# Patient Record
Sex: FEMALE | Race: AMERICAN INDIAN OR ALASKA NATIVE | NOT HISPANIC OR LATINO | URBAN - METROPOLITAN AREA
[De-identification: names, ages, dates, MRNs, and addresses within clinical notes are randomized per-mention and may not be internally consistent; named-entity substitution may affect disease eponyms.]

---

## 2024-09-12 ENCOUNTER — EMERGENCY (EMERGENCY)
Facility: HOSPITAL | Age: 24
LOS: 1 days | Discharge: HOME | End: 2024-09-12
Attending: STUDENT IN AN ORGANIZED HEALTH CARE EDUCATION/TRAINING PROGRAM | Admitting: STUDENT IN AN ORGANIZED HEALTH CARE EDUCATION/TRAINING PROGRAM
Payer: MEDICAID

## 2024-09-12 VITALS
WEIGHT: 130.07 LBS | HEIGHT: 63 IN | RESPIRATION RATE: 16 BRPM | TEMPERATURE: 98 F | OXYGEN SATURATION: 100 % | DIASTOLIC BLOOD PRESSURE: 67 MMHG | HEART RATE: 72 BPM | SYSTOLIC BLOOD PRESSURE: 91 MMHG

## 2024-09-12 VITALS
DIASTOLIC BLOOD PRESSURE: 82 MMHG | TEMPERATURE: 98 F | RESPIRATION RATE: 18 BRPM | HEART RATE: 77 BPM | SYSTOLIC BLOOD PRESSURE: 117 MMHG | OXYGEN SATURATION: 100 %

## 2024-09-12 LAB
ALBUMIN SERPL ELPH-MCNC: 3.8 G/DL — SIGNIFICANT CHANGE UP (ref 3.3–5)
ALP SERPL-CCNC: 101 U/L — SIGNIFICANT CHANGE UP (ref 40–120)
ALT FLD-CCNC: 23 U/L — SIGNIFICANT CHANGE UP (ref 12–78)
ANION GAP SERPL CALC-SCNC: 7 MMOL/L — SIGNIFICANT CHANGE UP (ref 5–17)
AST SERPL-CCNC: 32 U/L — SIGNIFICANT CHANGE UP (ref 15–37)
BASOPHILS # BLD AUTO: 0.06 K/UL — SIGNIFICANT CHANGE UP (ref 0–0.2)
BASOPHILS NFR BLD AUTO: 0.6 % — SIGNIFICANT CHANGE UP (ref 0–2)
BILIRUB SERPL-MCNC: 0.2 MG/DL — SIGNIFICANT CHANGE UP (ref 0.2–1.2)
BUN SERPL-MCNC: 12 MG/DL — SIGNIFICANT CHANGE UP (ref 7–23)
CALCIUM SERPL-MCNC: 9.2 MG/DL — SIGNIFICANT CHANGE UP (ref 8.5–10.1)
CHLORIDE SERPL-SCNC: 107 MMOL/L — SIGNIFICANT CHANGE UP (ref 96–108)
CO2 SERPL-SCNC: 24 MMOL/L — SIGNIFICANT CHANGE UP (ref 22–31)
CREAT SERPL-MCNC: 0.71 MG/DL — SIGNIFICANT CHANGE UP (ref 0.5–1.3)
EGFR: 120 ML/MIN/1.73M2 — SIGNIFICANT CHANGE UP
EOSINOPHIL # BLD AUTO: 0.1 K/UL — SIGNIFICANT CHANGE UP (ref 0–0.5)
EOSINOPHIL NFR BLD AUTO: 1.1 % — SIGNIFICANT CHANGE UP (ref 0–6)
GLUCOSE SERPL-MCNC: 87 MG/DL — SIGNIFICANT CHANGE UP (ref 70–99)
HCG SERPL-ACNC: <1 MIU/ML — SIGNIFICANT CHANGE UP
HCG UR QL: NEGATIVE — SIGNIFICANT CHANGE UP
HCT VFR BLD CALC: 39.7 % — SIGNIFICANT CHANGE UP (ref 34.5–45)
HGB BLD-MCNC: 12.7 G/DL — SIGNIFICANT CHANGE UP (ref 11.5–15.5)
IMM GRANULOCYTES NFR BLD AUTO: 0.1 % — SIGNIFICANT CHANGE UP (ref 0–0.9)
LYMPHOCYTES # BLD AUTO: 4.57 K/UL — HIGH (ref 1–3.3)
LYMPHOCYTES # BLD AUTO: 49.4 % — HIGH (ref 13–44)
MCHC RBC-ENTMCNC: 29.5 PG — SIGNIFICANT CHANGE UP (ref 27–34)
MCHC RBC-ENTMCNC: 32 GM/DL — SIGNIFICANT CHANGE UP (ref 32–36)
MCV RBC AUTO: 92.1 FL — SIGNIFICANT CHANGE UP (ref 80–100)
MONOCYTES # BLD AUTO: 0.8 K/UL — SIGNIFICANT CHANGE UP (ref 0–0.9)
MONOCYTES NFR BLD AUTO: 8.6 % — SIGNIFICANT CHANGE UP (ref 2–14)
NEUTROPHILS # BLD AUTO: 3.72 K/UL — SIGNIFICANT CHANGE UP (ref 1.8–7.4)
NEUTROPHILS NFR BLD AUTO: 40.2 % — LOW (ref 43–77)
NRBC # BLD: 0 /100 WBCS — SIGNIFICANT CHANGE UP (ref 0–0)
PLATELET # BLD AUTO: 290 K/UL — SIGNIFICANT CHANGE UP (ref 150–400)
POTASSIUM SERPL-MCNC: 5.1 MMOL/L — SIGNIFICANT CHANGE UP (ref 3.5–5.3)
POTASSIUM SERPL-SCNC: 5.1 MMOL/L — SIGNIFICANT CHANGE UP (ref 3.5–5.3)
PROT SERPL-MCNC: 8 G/DL — SIGNIFICANT CHANGE UP (ref 6–8.3)
RBC # BLD: 4.31 M/UL — SIGNIFICANT CHANGE UP (ref 3.8–5.2)
RBC # FLD: 13.7 % — SIGNIFICANT CHANGE UP (ref 10.3–14.5)
SODIUM SERPL-SCNC: 138 MMOL/L — SIGNIFICANT CHANGE UP (ref 135–145)
WBC # BLD: 9.26 K/UL — SIGNIFICANT CHANGE UP (ref 3.8–10.5)
WBC # FLD AUTO: 9.26 K/UL — SIGNIFICANT CHANGE UP (ref 3.8–10.5)

## 2024-09-12 PROCEDURE — 99284 EMERGENCY DEPT VISIT MOD MDM: CPT | Mod: 25

## 2024-09-12 PROCEDURE — 80053 COMPREHEN METABOLIC PANEL: CPT

## 2024-09-12 PROCEDURE — 70486 CT MAXILLOFACIAL W/O DYE: CPT | Mod: 26,MC

## 2024-09-12 PROCEDURE — 84702 CHORIONIC GONADOTROPIN TEST: CPT

## 2024-09-12 PROCEDURE — 99284 EMERGENCY DEPT VISIT MOD MDM: CPT

## 2024-09-12 PROCEDURE — 70486 CT MAXILLOFACIAL W/O DYE: CPT | Mod: MC

## 2024-09-12 PROCEDURE — 81025 URINE PREGNANCY TEST: CPT

## 2024-09-12 PROCEDURE — 36415 COLL VENOUS BLD VENIPUNCTURE: CPT

## 2024-09-12 PROCEDURE — 85025 COMPLETE CBC W/AUTO DIFF WBC: CPT

## 2024-09-12 RX ORDER — KETOROLAC TROMETHAMINE 30 MG/ML
15 INJECTION, SOLUTION INTRAMUSCULAR ONCE
Refills: 0 | Status: DISCONTINUED | OUTPATIENT
Start: 2024-09-12 | End: 2024-09-12

## 2024-09-12 RX ORDER — SODIUM CHLORIDE 9 MG/ML
1000 INJECTION INTRAMUSCULAR; INTRAVENOUS; SUBCUTANEOUS ONCE
Refills: 0 | Status: COMPLETED | OUTPATIENT
Start: 2024-09-12 | End: 2024-09-12

## 2024-09-12 RX ORDER — METHOCARBAMOL 750 MG/1
750 TABLET, FILM COATED ORAL ONCE
Refills: 0 | Status: COMPLETED | OUTPATIENT
Start: 2024-09-12 | End: 2024-09-12

## 2024-09-12 RX ORDER — IBUPROFEN 600 MG
600 TABLET ORAL ONCE
Refills: 0 | Status: COMPLETED | OUTPATIENT
Start: 2024-09-12 | End: 2024-09-12

## 2024-09-12 RX ADMIN — Medication 600 MILLIGRAM(S): at 20:26

## 2024-09-12 RX ADMIN — METHOCARBAMOL 750 MILLIGRAM(S): 750 TABLET, FILM COATED ORAL at 20:26

## 2024-09-12 NOTE — ED PROVIDER NOTE - NSFOLLOWUPCLINICS_GEN_ALL_ED_FT
[facility]  Dentistry  .  [place] [address]  Phone: [phone]  Fax:

## 2024-09-12 NOTE — ED ADULT NURSE NOTE - NSFALLUNIVINTERV_ED_ALL_ED
Bed/Stretcher in lowest position, wheels locked, appropriate side rails in place/Call bell, personal items and telephone in reach/Instruct patient to call for assistance before getting out of bed/chair/stretcher/Non-slip footwear applied when patient is off stretcher/[place] to call system/Physically safe environment - no spills, clutter or unnecessary equipment/Purposeful proactive rounding/Room/bathroom lighting operational, light cord in reach

## 2024-09-12 NOTE — ED PROVIDER NOTE - OBJECTIVE STATEMENT
24F who presents with right sided jaw pain for past few months. pain with opening the jaw. no fevers or chills, she has an oral surgeon however she was having issues with dental infection which was treated and has not been able to see them for this jaw pain

## 2024-09-12 NOTE — ED ADULT NURSE NOTE - OBJECTIVE STATEMENT
Patient complaining of R jaw pain for 2 months. Patient states it hurts to open mouth. denies taking medication for pain.

## 2024-09-12 NOTE — ED PROVIDER NOTE - PHYSICAL EXAMINATION
Gen: Awake, Alert, NAD  Head:  NC/AT  Eyes:  PERRL, EOMI, Conjunctiva pink, no scleral icterus  ENT:  no exudates, no erythema, uvula midline, TMs clear bilaterally, moist mucus membranes, slightly decreased JAw ROM, some tenderenss to right TMJ area without swelling or overlying skin changes. no signs of infection inside  Neck: supple, nontender, no meningismus, no JVD, trachea midline  Cardiac/CV:  S1 S2, RRR, no murmurs  Respiratory/Pulm:  CTAB, good air movement, normal resp effort, no wheezes/stridor/retractions/rales/rhonchi  Gastrointestinal/Abdomen:  Soft, nontender, nondistended, no rebound/guarding  Ext:  warm, well perfused, moving all extremities spontaneously, no cyanosis, no erythema, no edema, distal pulses intact  Skin: intact, no rash, no petechiae, no ecchymosis

## 2024-09-12 NOTE — ED PROVIDER NOTE - PATIENT PORTAL LINK FT
You can access the FollowMyHealth Patient Portal offered by [facility] by registering at the following website: [email or web address]. By joining [facility]’s FollowMyHealth portal, you will also be able to view your health information using other applications (apps) compatible with our system.

## 2024-09-12 NOTE — ED PROVIDER NOTE - CLINICAL SUMMARY MEDICAL DECISION MAKING FREE TEXT BOX
Patient is a 24y old  Female who presents with a chief complaint of TMJ disease Patient is a 24y old  Female who presents with a chief complaint of right jaw pain which has been ongoing for months. no fevers or chills    PE: patient is well appearing no acute distress, slightly reduced movement of the jaw due to pain, no signs of swelling or infection, no skin changes, some tenderness over right TMJ    plan for CT scan and labs, medications for pain    Patient was re-evaluated at this time her CT scan shows abnormal bony contours of the right TMJ. The Patient will be discharged home at this time. told to take OTC medications for pain and to follow soft or easy to chew diet. Their lab and/or imaging results were explained with the patient and they were instructed to go over them with their PCP. All questions were answered at this time.     Patient was told to follow up with their PCP within the next 2 days. they were told to return to the ED if they have worsening pain, fevers, vomiting. will follow up with oral surgeon    patient will be discharged home at this time, they are in agreement with the plan

## 2024-09-12 NOTE — ED PROVIDER NOTE - NSFOLLOWUPINSTRUCTIONS_ED_ALL_ED_FT
Please return to the Emergency Department immediately for worsening pain, fevers, vomiting and if you have any other problems or concerns. Please follow up with your Primary Care Physician within the next 2 days.    Please take any prescription medications prescribed as instructed    Please follow up with oral surgeon within the next 2 days as well      TEMPOROMANDIBULAR DISORDER - General Information    Temporomandibular Disorder    WHAT YOU NEED TO KNOW:    What is temporomandibular disorder? Temporomandibular disorder is a condition that causes pain in your jaw. The disorder affects the joint between your temporal bone and your mandible (jawbone). The muscles and nerves around the joint are also affected.  Jaw    What causes temporomandibular disorder?    Dislocation of the cartilage disc in the joint    Deformities of the jaw    Inflammation, infection, arthritis, muscle problems, or tumors in the jaw area    Injury to or fracture of the jawbone    Muscle strain from chewing or teeth clenching or grinding  What are the signs and symptoms of temporomandibular disorder?    Popping or grating sound when you open or close your mouth    Headache or pain in your jaw, ear, neck, or face    Pain or swelling of the jaw muscles    Tingling or numbness in the jaw or face    Trouble opening or closing your mouth, or your jaw locks  How is temporomandibular disorder diagnosed? Your healthcare provider will examine your jaw, face, and neck. Your provider will ask about your health conditions or injuries. You may also need the following tests:    X-rays: You may need to have x-rays of your skull, jaw, or teeth.    Arthrogram: This is an x-ray that uses contrast liquid to help the pictures show up better. Tell the healthcare provider if you have ever had an allergic reaction to contrast liquid.    MRI: This scan uses powerful magnets and a computer to take pictures of your jaw. You may be given contrast liquid to help the pictures show up better. Tell the healthcare provider if you have ever had an allergic reaction to contrast liquid. Do not enter the MRI room with anything metal. Metal can cause serious injury. Tell the healthcare provider if you have any metal in or on your body.    Bone scan: A bone scan is used to check your bones for disease or damage. You will get a radioactive liquid, called a tracer, through a vein in your arm. The tracer collects in your bones. Pictures will then be taken to look for problems. Examples of bone problems include fractures (breaks) and infection.  How is temporomandibular disorder treated?    Medicines:  NSAIDs: These medicines decrease swelling and pain. You can buy NSAIDs without a doctor's order. Ask your healthcare provider which medicine is right for you, and how much to take. Take as directed. NSAIDs can cause stomach bleeding or kidney problems if not taken correctly.    Botulinum toxin: This may be injected into the muscles of your jaw to decrease pain.    Steroid medicine: These may be injected into the joint to decrease pain and swelling.    Muscle relaxers help decrease pain and muscle spasms.    Surgery: You may need surgery to fix your teeth, jawbone, or the joint.  What are the risks of temporomandibular disorder? You may bleed or get an infection if you have surgery. If left untreated, your condition may get worse. You may have trouble breathing, eating, drinking, talking, or opening your mouth. If not treated early, temporomandibular disorder may lead to permanent injury, such as nerve damage, deformity, or paralysis.    How can I manage my symptoms?    Eat soft foods: Your healthcare provider may suggest that you eat only soft foods for several days. A dietitian may work with you to find foods that are easier to bite, chew, or swallow. Examples are soup, applesauce, cottage cheese, pudding, yogurt, and soft fruits.    Use jaw supporting devices: Splints may be used to support your jaw or keep it from moving. You may need to wear a mouth guard to keep you from clenching or grinding your teeth while you are sleeping.    Use ice and heat: Ice helps decrease swelling and pain. Ice may also help prevent tissue damage. Use an ice pack, or put crushed ice in a plastic bag. Cover it with a towel and place it on your jaw for 15 to 20 minutes every hour or as directed. After the first 24 to 48 hours, use heat to decrease pain, swelling, and muscle spasms. Apply heat on the area for 20 to 30 minutes every 2 hours for as many days as directed.    Go to physical therapy: A physical therapist teaches you exercises to help improve movement and strength, and to decrease pain in your jaw. A speech therapist may help you with swallowing and speech exercises.  When should I contact my healthcare provider?    You have a fever.    Your splint or mouth guard is loose.    You have questions or concerns about your condition or care.  When should I seek immediate care or call 911?    You have nausea, are vomiting, or cannot keep liquids down.    You have pain that does not go away even after you take your pain medicine.    You have problems breathing, talking, drinking, eating, or swallowing.    Your splint or mouth guard gets damaged or broken.  CARE AGREEMENT:    You have the right to help plan your care. Learn about your health condition and how it may be treated. Discuss treatment options with your healthcare providers to decide what care you want to receive. You always have the right to refuse treatment.